# Patient Record
Sex: FEMALE | URBAN - METROPOLITAN AREA
[De-identification: names, ages, dates, MRNs, and addresses within clinical notes are randomized per-mention and may not be internally consistent; named-entity substitution may affect disease eponyms.]

---

## 2020-07-30 ENCOUNTER — OFFICE VISIT (OUTPATIENT)
Dept: URBAN - METROPOLITAN AREA CLINIC 128 | Facility: CLINIC | Age: 80
End: 2020-07-30
Payer: MEDICARE

## 2020-07-30 DIAGNOSIS — I48.91 ATRIAL FIBRILLATION: ICD-10-CM

## 2020-07-30 DIAGNOSIS — Z12.11 COLON CANCER SCREENING: ICD-10-CM

## 2020-07-30 DIAGNOSIS — I48.4 ATYPICAL ATRIAL FLUTTER: ICD-10-CM

## 2020-07-30 PROCEDURE — 99213 OFFICE O/P EST LOW 20 MIN: CPT | Performed by: INTERNAL MEDICINE

## 2020-07-30 PROCEDURE — 99243 OFF/OP CNSLTJ NEW/EST LOW 30: CPT | Performed by: INTERNAL MEDICINE

## 2020-07-30 RX ORDER — ATORVASTATIN CALCIUM 40 MG/1
1 TABLET TABLET, FILM COATED ORAL ONCE A DAY
Status: ACTIVE | COMMUNITY

## 2020-07-30 RX ORDER — APIXABAN 5 MG/1
AS DIRECTED TABLET, FILM COATED ORAL
Status: ACTIVE | COMMUNITY

## 2020-07-30 RX ORDER — SODIUM, POTASSIUM,MAG SULFATES 17.5-3.13G
354 ML SOLUTION, RECONSTITUTED, ORAL ORAL ONCE
Qty: 354 MILLILITER | Refills: 0 | OUTPATIENT
Start: 2020-07-30

## 2020-07-30 NOTE — HPI-TODAY'S VISIT:
pt comes in for colon screening.  referred by dr. marisol tripathi. previous colonoscopy in 2005 and 2011 with no polyps.  5 yr f/u given no fam hx of colon cancer or colon polyps.   notes 2-3 bm/day that are mostly formed occ loose stools with high stress and heavy food no blood or mucus normal appetite no  n/v no dysphagia stable weight

## 2020-08-11 ENCOUNTER — TELEPHONE ENCOUNTER (OUTPATIENT)
Dept: URBAN - METROPOLITAN AREA CLINIC 92 | Facility: CLINIC | Age: 80
End: 2020-08-11

## 2020-08-27 ENCOUNTER — OFFICE VISIT (OUTPATIENT)
Dept: URBAN - METROPOLITAN AREA SURGERY CENTER 31 | Facility: SURGERY CENTER | Age: 80
End: 2020-08-27
Payer: MEDICARE

## 2020-08-27 ENCOUNTER — CLAIMS CREATED FROM THE CLAIM WINDOW (OUTPATIENT)
Dept: URBAN - METROPOLITAN AREA CLINIC 4 | Facility: CLINIC | Age: 80
End: 2020-08-27
Payer: MEDICARE

## 2020-08-27 DIAGNOSIS — Z12.11 COLON CANCER SCREENING: ICD-10-CM

## 2020-08-27 DIAGNOSIS — D12.2 ADENOMA OF ASCENDING COLON: ICD-10-CM

## 2020-08-27 DIAGNOSIS — D12.2 BENIGN NEOPLASM OF ASCENDING COLON: ICD-10-CM

## 2020-08-27 PROCEDURE — 88305 TISSUE EXAM BY PATHOLOGIST: CPT | Performed by: PATHOLOGY

## 2020-08-27 PROCEDURE — 45385 COLONOSCOPY W/LESION REMOVAL: CPT | Performed by: INTERNAL MEDICINE

## 2020-08-27 PROCEDURE — G8907 PT DOC NO EVENTS ON DISCHARG: HCPCS | Performed by: INTERNAL MEDICINE

## 2020-08-27 PROCEDURE — G9933 CANC DETECTD DURING COL SCRN: HCPCS | Performed by: INTERNAL MEDICINE

## 2022-09-09 ENCOUNTER — WEB ENCOUNTER (OUTPATIENT)
Dept: URBAN - METROPOLITAN AREA CLINIC 23 | Facility: CLINIC | Age: 82
End: 2022-09-09

## 2022-09-15 ENCOUNTER — OFFICE VISIT (OUTPATIENT)
Dept: URBAN - METROPOLITAN AREA CLINIC 23 | Facility: CLINIC | Age: 82
End: 2022-09-15
Payer: MEDICARE

## 2022-09-15 DIAGNOSIS — K52.9 CHRONIC DIARRHEA: ICD-10-CM

## 2022-09-15 PROCEDURE — 99214 OFFICE O/P EST MOD 30 MIN: CPT | Performed by: INTERNAL MEDICINE

## 2022-09-15 RX ORDER — DILTIAZEM HYDROCHLORIDE 180 MG/1
1 CAPSULE CAPSULE, EXTENDED RELEASE ORAL ONCE A DAY
Status: ACTIVE | COMMUNITY

## 2022-09-15 RX ORDER — ATORVASTATIN CALCIUM 40 MG/1
1 TABLET TABLET, FILM COATED ORAL ONCE A DAY
Status: ACTIVE | COMMUNITY

## 2022-09-15 RX ORDER — SODIUM, POTASSIUM,MAG SULFATES 17.5-3.13G
354 ML SOLUTION, RECONSTITUTED, ORAL ORAL ONCE
Qty: 354 MILLILITER | Refills: 0 | Status: ACTIVE | COMMUNITY
Start: 2020-07-30

## 2022-09-15 RX ORDER — SOTALOL HYDROCHLORIDE 80 MG/1
1 TABLET TABLET ORAL
Status: ACTIVE | COMMUNITY

## 2022-09-15 RX ORDER — CITALOPRAM 10 MG/1
1 TABLET TABLET, FILM COATED ORAL ONCE A DAY
Status: ACTIVE | COMMUNITY

## 2022-09-15 RX ORDER — APIXABAN 5 MG/1
AS DIRECTED TABLET, FILM COATED ORAL
Status: ACTIVE | COMMUNITY

## 2022-09-15 NOTE — HPI-TODAY'S VISIT:
81 yo female presenting for evaluation for diarrhea- states that it has been going on for 9 months to 1 year (in her pcp note it says a year and a half) -she had a colonoscopy in 2020- she denies that she was haing diarrhea at that time.   -reports tht she will have normal bm and then have explosive diarrhea -she has a lot of bloating and gas and then will have loose stool -she has about 3 loose stool per day - she can go a day or two with formed stool and then has abrupt diarrhea -no clear triggers that she can tell  -has cut out ice cream -eats lean proteins -saw PA at Dr. Null office- had bloodwork and did a stool sample.  reports that the bloodwork and stool was normal -she does wake up some nights with bm but generally sleeps through the night she is not using any anti diarrheal medications- she was given the lomotil 1 tablet qid prn.  she took one or two and got constipated.  she has taken immodium in the past which works and then the diarrhea is back.   -she states that she has no specific abdominal pain -denies any recent antibiotic use -no changes in her weight  -denies any new prescription medications she recently started taking  -she started taking citalopram in 2020 , otehrwise no new medication she has a hx of a fib on eliquis ============================================================================ colonoscopy in 2020- 1 ta, had diverticulosis

## 2022-09-21 LAB — PANCREATIC ELASTASE, FECAL: 387

## 2022-10-11 ENCOUNTER — TELEPHONE ENCOUNTER (OUTPATIENT)
Dept: URBAN - METROPOLITAN AREA CLINIC 111 | Facility: CLINIC | Age: 82
End: 2022-10-11

## 2022-11-14 ENCOUNTER — OFFICE VISIT (OUTPATIENT)
Dept: URBAN - METROPOLITAN AREA CLINIC 23 | Facility: CLINIC | Age: 82
End: 2022-11-14
Payer: MEDICARE

## 2022-11-14 VITALS
SYSTOLIC BLOOD PRESSURE: 125 MMHG | DIASTOLIC BLOOD PRESSURE: 69 MMHG | TEMPERATURE: 97.6 F | HEART RATE: 64 BPM | BODY MASS INDEX: 21.99 KG/M2 | HEIGHT: 65 IN | WEIGHT: 132 LBS

## 2022-11-14 DIAGNOSIS — K52.9 CHRONIC DIARRHEA: ICD-10-CM

## 2022-11-14 PROCEDURE — 99213 OFFICE O/P EST LOW 20 MIN: CPT | Performed by: INTERNAL MEDICINE

## 2022-11-14 RX ORDER — CITALOPRAM 20 MG/1
1 TABLET TABLET, FILM COATED ORAL ONCE A DAY
Status: ACTIVE | COMMUNITY

## 2022-11-14 RX ORDER — DILTIAZEM HYDROCHLORIDE 180 MG/1
1 CAPSULE CAPSULE, EXTENDED RELEASE ORAL ONCE A DAY
Status: ACTIVE | COMMUNITY

## 2022-11-14 RX ORDER — SODIUM, POTASSIUM,MAG SULFATES 17.5-3.13G
354 ML SOLUTION, RECONSTITUTED, ORAL ORAL ONCE
Qty: 354 MILLILITER | Refills: 0 | Status: ON HOLD | COMMUNITY
Start: 2020-07-30

## 2022-11-14 RX ORDER — CITALOPRAM 10 MG/1
1 TABLET TABLET, FILM COATED ORAL ONCE A DAY
Status: ON HOLD | COMMUNITY

## 2022-11-14 RX ORDER — ATORVASTATIN CALCIUM 40 MG/1
1 TABLET TABLET, FILM COATED ORAL ONCE A DAY
Status: ACTIVE | COMMUNITY

## 2022-11-14 RX ORDER — APIXABAN 5 MG/1
AS DIRECTED TABLET, FILM COATED ORAL
Status: ACTIVE | COMMUNITY

## 2022-11-14 RX ORDER — SOTALOL HYDROCHLORIDE 80 MG/1
1 TABLET TABLET ORAL
Status: ACTIVE | COMMUNITY

## 2022-11-14 NOTE — HPI-TODAY'S VISIT:
81 yo female presenting for evaluation for diarrhea- states that it has been going on for 9 months to 1 year (in her pcp note it says a year and a half) -she had a colonoscopy in 2020- she denies that she was haing diarrhea at that time.   -reports tht she will have normal bm and then have explosive diarrhea -she has a lot of bloating and gas and then will have loose stool -she has about 3 loose stool per day - she can go a day or two with formed stool and then has abrupt diarrhea -no clear triggers that she can tell  -has cut out ice cream -eats lean proteins -saw PA at Dr. Null office- had bloodwork and did a stool sample.  reports that the bloodwork and stool was normal -she does wake up some nights with bm but generally sleeps through the night she is not using any anti diarrheal medications- she was given the lomotil 1 tablet qid prn.  she took one or two and got constipated.  she has taken immodium in the past which works and then the diarrhea is back.   -she states that she has no specific abdominal pain -denies any recent antibiotic use -no changes in her weight  -denies any new prescription medications she recently started taking  -she started taking citalopram in 2020 , otehrwise no new medication she has a hx of a fib on eliquis ============================================================================ colonoscopy in 2020- 1 ta, had diverticulosis ======================================================================================= 11/14/2022 she states that she is not having the 'blowouts from before' -has some more formation.  goes 3 days with more formed bm and then will have loose stool again.   -morning is watery with chunks of stool, then bigger chunks.   -she  hasn't kept a food diary- she eats the same food on a daily basis -she feels it is better than where she was  -she states that she tried the ib guard, had some improvement -she states that she is taking metamucil and taking beano in the morning daily -she looked at the fodmap diet an has tried to make changes to her diet

## 2023-05-15 ENCOUNTER — OFFICE VISIT (OUTPATIENT)
Dept: URBAN - METROPOLITAN AREA CLINIC 23 | Facility: CLINIC | Age: 83
End: 2023-05-15
Payer: MEDICARE

## 2023-05-15 ENCOUNTER — DASHBOARD ENCOUNTERS (OUTPATIENT)
Age: 83
End: 2023-05-15

## 2023-05-15 VITALS — WEIGHT: 131.4 LBS | BODY MASS INDEX: 21.89 KG/M2 | HEIGHT: 65 IN

## 2023-05-15 DIAGNOSIS — R19.7 CHRONIC DIARRHEA: ICD-10-CM

## 2023-05-15 PROCEDURE — 99213 OFFICE O/P EST LOW 20 MIN: CPT | Performed by: INTERNAL MEDICINE

## 2023-05-15 RX ORDER — CITALOPRAM 10 MG/1
1 TABLET TABLET, FILM COATED ORAL ONCE A DAY
Status: ON HOLD | COMMUNITY

## 2023-05-15 RX ORDER — SOTALOL HYDROCHLORIDE 80 MG/1
1 TABLET TABLET ORAL
Status: ACTIVE | COMMUNITY

## 2023-05-15 RX ORDER — DILTIAZEM HYDROCHLORIDE 180 MG/1
1 CAPSULE CAPSULE, EXTENDED RELEASE ORAL ONCE A DAY
Status: ACTIVE | COMMUNITY

## 2023-05-15 RX ORDER — APIXABAN 5 MG/1
AS DIRECTED TABLET, FILM COATED ORAL
Status: ACTIVE | COMMUNITY

## 2023-05-15 RX ORDER — CITALOPRAM 20 MG/1
1 TABLET TABLET, FILM COATED ORAL ONCE A DAY
Status: ACTIVE | COMMUNITY

## 2023-05-15 RX ORDER — SODIUM, POTASSIUM,MAG SULFATES 17.5-3.13G
354 ML SOLUTION, RECONSTITUTED, ORAL ORAL ONCE
Qty: 354 MILLILITER | Refills: 0 | Status: ON HOLD | COMMUNITY
Start: 2020-07-30

## 2023-05-15 RX ORDER — ATORVASTATIN CALCIUM 40 MG/1
1 TABLET TABLET, FILM COATED ORAL ONCE A DAY
Status: ACTIVE | COMMUNITY

## 2023-05-15 NOTE — HPI-TODAY'S VISIT:
83 yo female presenting for evaluation for diarrhea- states that it has been going on for 9 months to 1 year (in her pcp note it says a year and a half) -she had a colonoscopy in 2020- she denies that she was haing diarrhea at that time.   -reports tht she will have normal bm and then have explosive diarrhea -she has a lot of bloating and gas and then will have loose stool -she has about 3 loose stool per day - she can go a day or two with formed stool and then has abrupt diarrhea -no clear triggers that she can tell  -has cut out ice cream -eats lean proteins -saw PA at Dr. Null office- had bloodwork and did a stool sample.  reports that the bloodwork and stool was normal -she does wake up some nights with bm but generally sleeps through the night she is not using any anti diarrheal medications- she was given the lomotil 1 tablet qid prn.  she took one or two and got constipated.  she has taken immodium in the past which works and then the diarrhea is back.   -she states that she has no specific abdominal pain -denies any recent antibiotic use -no changes in her weight  -denies any new prescription medications she recently started taking  -she started taking citalopram in 2020 , otehrwise no new medication she has a hx of a fib on eliquis ============================================================================ colonoscopy in 2020- 1 ta, had diverticulosis ======================================================================================= 11/14/2022 she states that she is not having the 'blowouts from before' -has some more formation.  goes 3 days with more formed bm and then will have loose stool again.   -morning is watery with chunks of stool, then bigger chunks.   -she  hasn't kept a food diary- she eats the same food on a daily basis -she feels it is better than where she was  -she states that she tried the ib guard, had some improvement -she states that she is taking metamucil and taking beano in the morning daily -she looked at the fodmap diet an has tried to make changes to her diet ================================================================================== 5/15/2023 she is taking ib guard in the morning. using balance of nature fiber suppement in a capsule she is no longer having the frequent blow outs , has very infrequently an episode but it goes back to normal quickly. she is able to tolereate this -she overall feels much better -her labs are stable -states that she has had some blood in her urine and is being worked up